# Patient Record
Sex: MALE | Race: WHITE | URBAN - NONMETROPOLITAN AREA
[De-identification: names, ages, dates, MRNs, and addresses within clinical notes are randomized per-mention and may not be internally consistent; named-entity substitution may affect disease eponyms.]

---

## 2017-07-13 ENCOUNTER — OFFICE VISIT (OUTPATIENT)
Dept: FAMILY MEDICINE | Facility: CLINIC | Age: 62
End: 2017-07-13
Payer: COMMERCIAL

## 2017-07-13 VITALS
BODY MASS INDEX: 23.11 KG/M2 | HEART RATE: 76 BPM | DIASTOLIC BLOOD PRESSURE: 70 MMHG | SYSTOLIC BLOOD PRESSURE: 126 MMHG | TEMPERATURE: 97.4 F | WEIGHT: 152 LBS | RESPIRATION RATE: 20 BRPM

## 2017-07-13 DIAGNOSIS — I10 BENIGN ESSENTIAL HYPERTENSION: Primary | ICD-10-CM

## 2017-07-13 DIAGNOSIS — R14.3 FLATULENCE/WIND: ICD-10-CM

## 2017-07-13 PROCEDURE — 36415 COLL VENOUS BLD VENIPUNCTURE: CPT | Performed by: NURSE PRACTITIONER

## 2017-07-13 PROCEDURE — 99213 OFFICE O/P EST LOW 20 MIN: CPT | Performed by: NURSE PRACTITIONER

## 2017-07-13 PROCEDURE — 80048 BASIC METABOLIC PNL TOTAL CA: CPT | Performed by: NURSE PRACTITIONER

## 2017-07-13 RX ORDER — LISINOPRIL 10 MG/1
10 TABLET ORAL DAILY
Qty: 90 TABLET | Refills: 3 | Status: SHIPPED | OUTPATIENT
Start: 2017-07-13 | End: 2018-07-05

## 2017-07-13 NOTE — PROGRESS NOTES
SUBJECTIVE:                                                    Mc Espana is a 61 year old male who presents to clinic today for the following health issues:  refuses FIT TEST or colonoscopy    Hypertension Follow-up      Outpatient blood pressures are being checked at dentist once .  Results are 130/70.    Low Salt Diet: not monitoring salt      Amount of exercise or physical activity: Phthisically active daily    Problems taking medications regularly: No    Medication side effects: none    Diet: regular (no restrictions)    BP Readings from Last 6 Encounters:   07/13/17 126/70   07/19/16 128/86   07/17/15 128/76   06/24/15 134/76   01/05/15 134/76   02/06/14 132/79       HPI:   PCP:  No primary care provider on file. None    Patient Active Problem List   Diagnosis     CARDIOVASCULAR SCREENING; LDL GOAL LESS THAN 160     Benign essential hypertension, BP <140/90     Current Outpatient Prescriptions   Medication     lisinopril (PRINIVIL/ZESTRIL) 10 MG tablet     latanoprost (XALATAN) 0.005 % ophthalmic solution     [DISCONTINUED] lisinopril (PRINIVIL,ZESTRIL) 10 MG tablet     No current facility-administered medications for this visit.        Reviewed and updated:  Tobacco  Allergies  Meds  Med Hx  Surg Hx  Fam Hx  Soc Hx     ROS:  Constitutional, HEENT, cardiovascular, pulmonary, gi and gu systems are negative, except as otherwise noted.  CV: NEGATIVE for chest pain, palpitations or peripheral edema and Hx HTN    PHYSICAL EXAM:   /70 (BP Location: Right arm, Patient Position: Sitting, Cuff Size: Adult Regular)  Pulse 76  Temp 97.4  F (36.3  C) (Tympanic)  Resp 20  Wt 152 lb (68.9 kg)  BMI 23.11 kg/m2  Body mass index is 23.11 kg/(m^2).  GENERAL APPEARANCE: healthy, alert and no distress  RESP: lungs clear to auscultation - no rales, rhonchi or wheezes  CV: regular rates and rhythm, normal S1 S2, no S3 or S4 and no murmur, click or rub  MS: extremities normal- no gross deformities noted  SKIN:  no suspicious lesions or rashes  PSYCH: mentation appears normal and affect normal/bright    ASSESSMENT & PLAN:   Mc was seen today for hypertension.    Diagnoses and all orders for this visit:    Benign essential hypertension, BP <140/90  -     lisinopril (PRINIVIL/ZESTRIL) 10 MG tablet; Take 1 tablet (10 mg) by mouth daily  -     Basic metabolic panel    Flatulence/wind          Patient Instructions     BP  Lisinopril refilled    Next year: due for fasting cholesterol    Prevention: Annual FIT TEST or colonoscopy      Colorectal Cancer Screening    Colorectal cancer (cancer in the colon or rectum) is a leading cause of cancer deaths in the [place] But it doesn t have to be. When this cancer is found and removed early, the chances of a full recovery are very good. Because colorectal cancer rarely causes symptoms in its early stages, screening for the disease is important. It s even more crucial if you have risk factors for the disease. Learn more about colorectal cancer and its risk factors. Then talk to your healthcare provider about being screened. You could be saving your own life.  Risk factors for colorectal cancer  Your risk of having colorectal cancer increases if you:    Are 50 years of age or older    Have a family history or personal history of colorectal cancer or polyps    Have a personal history of type 2 diabetes, Crohn s disease, or ulcerative colitis    Have an inherited genetic syndrome like Lieberman syndrome (also known as HNPCC) or familial adenomatous polyposis (FAP)    Are very overweight    Are not physically active    Smoke    Drink a lot of alcohol    Eat a lot of red or processed meat  The colon and rectum  Waste from food you eat enters the colon from the small intestine. As it travels through the colon, the waste (stool) loses water and becomes more solid. Intestinal muscles push it toward the sigmoid--the last section of the colon. Stool then moves into the rectum, where it s stored until  it s ready to leave the body during a bowel movement.  How cancer develops  Polyps are growths that form on the inner lining of the colon or rectum. Most are benign, which means they aren t cancerous. But over time, some polyps can become cancer (malignant). This happens when cells in these polyps begin growing abnormally. In time, malignant cells invade more and more of the colon and rectum. The cancer may also spread to nearby organs or lymph nodes or to other parts of the body. Finding and removing polyps can help prevent cancer from ever forming.  Your screening  Screening means looking for a health problem before you have symptoms. During screening for colorectal cancer, your healthcare provider will ask about your health history, examine you, and do one or more tests.  History and exam  The history and exam involve the following:    Health history. Your healthcare provider will ask about your health history. Mention if a family member has had colon cancer or polyps. Also mention any health problems you have had in the past.    Digital rectal exam (JUAN). During a JUAN, the healthcare provider inserts a lubricated gloved finger into the rectum. The test is painless and takes less than a minute. Healthcare providers agree that this test alone is not enough to screen for colorectal cancer.  Screening test choices  Fecal occult blood test (FOBT) or fecal immunochemical test (FIT)  These tests check for occult blood in stool (blood you can t see). Hidden blood may be a sign of colon polyps or cancer. A small sample of stool is tested for blood in a laboratory. Most often, you collect this sample at home using a kit your healthcare provider gives you. Follow the instructions carefully for using this kit. You might need to avoid certain foods and medicines before the test, as directed.    Scope exams  Here are two types of scope exams:    Colonoscopy. This test can be used to find and remove polyps anywhere in the  colon or rectum. The day before the test, you will do a bowel prep. This is a liquid diet plus a strong laxative solution or an enema. The bowel prep will cleanse your colon. You will be given instructions for this. Just before the test, you are given a medicine to make you sleepy. Then, a long, flexible, lighted tube called a colonoscope is gently inserted into the rectum and guided through the entire colon. Images of the colon are viewed on a video screen. Any polyps that are found are removed and sent to a lab for testing. If a polyp can t be removed, a sample of tissue is taken and the polyp might be removed later during surgery. You will need to bring someone with you to drive you home after this test.     Sigmoidoscopy. This test is similar to colonoscopy, but focuses only on the sigmoid colon and rectum. As with colonoscopy, bowel prep must be done the day before this test. It might not need to be as complete as the bowel prep for a colonoscopy. You are awake during the procedure, but you may be given medicine to help you relax. During the test, the healthcare provider guides a thin, flexible, lighted tube called a sigmoidoscope through your rectum and lower colon. The images are displayed on a video screen. Polyps are removed, if possible, and sent to a lab for testing.  Colonoscopy is the only screening test that lets your healthcare provider see the entire colon and rectum. This test also lets your healthcare provider remove any pieces of tissue that need to be looked at by a lab. If something suspicious is found using any other tests, you will likely need a colonoscopy.     When to call your healthcare provider after a test  Call your healthcare provider if you have any of the following after any screening test:    Bleeding    Fever of 100.4 F (38 C) or higher, or as directed by your healthcare provider    Abdominal pain    Vomiting   Date Last Reviewed: 11/4/2015 2000-2017 [employer]  [address] [place] [address]. All rights reserved. This information is not intended as a substitute for professional medical care. Always follow your healthcare professional's instructions.        Excess Gas  Certain foods produce gas when digested. In some people, these foods make an excessive amount of gas. This may cause bloating, burping, or increased gas passing through the rectum (flatulence).    Foods that cause gas  The following foods are more likely to cause this problem. Limit them, or remove them from your diet:    Broccoli    Cauliflower    [place] sprouts    Cabbage    Cooked dried beans    Fizzy (carbonated) drinks, such as sparkling water, soda, beer, and champagne  Other causes  Other causes of excess gas include:    Eating too fast or talking while you chew. This may cause you to swallow air. This increases the amount of gas in your stomach. And it may make your symptoms worse. Chew each mouthful completely before you swallow. Take your time.    Chewing on gum or sucking on hard candy. These cause you to swallow more often. And some of what you are swallowing is air. This leads to more gas in your stomach. Avoid chewing gum and hard candy.    Overeating. This may increase the feeling of being bloated and cause more gas. When you are full, stop eating.     Being constipated. This can increase the amount of normal intestinal gas. Avoid constipation by getting more fiber in your diet. Good sources of fiber include whole-grain cereal, fresh vegetables (except those in the above list), and fresh fruits. High-fiber foods absorb water and carry it out of the body. When adding more fiber to your diet, you also need to drink more water. You should drink at least 8, 8-ounce glasses of water (2 quarts) per day.  Date Last Reviewed: 8/1/2016 2000-2017 [employer]. [address] [place] [address]. All rights reserved. This information is not intended as a substitute for  professional medical care. Always follow your healthcare professional's instructions.          Risks, benefits, side effects and rationale for treatment plan fully discussed with the patient and understanding expressed.    VIN [facility]

## 2017-07-13 NOTE — PATIENT INSTRUCTIONS
BP  Lisinopril refilled    Next year: due for fasting cholesterol    Prevention: Annual FIT TEST or colonoscopy      Colorectal Cancer Screening    Colorectal cancer (cancer in the colon or rectum) is a leading cause of cancer deaths in the [place] But it doesn t have to be. When this cancer is found and removed early, the chances of a full recovery are very good. Because colorectal cancer rarely causes symptoms in its early stages, screening for the disease is important. It s even more crucial if you have risk factors for the disease. Learn more about colorectal cancer and its risk factors. Then talk to your healthcare provider about being screened. You could be saving your own life.  Risk factors for colorectal cancer  Your risk of having colorectal cancer increases if you:    Are 50 years of age or older    Have a family history or personal history of colorectal cancer or polyps    Have a personal history of type 2 diabetes, Crohn s disease, or ulcerative colitis    Have an inherited genetic syndrome like Lieberman syndrome (also known as HNPCC) or familial adenomatous polyposis (FAP)    Are very overweight    Are not physically active    Smoke    Drink a lot of alcohol    Eat a lot of red or processed meat  The colon and rectum  Waste from food you eat enters the colon from the small intestine. As it travels through the colon, the waste (stool) loses water and becomes more solid. Intestinal muscles push it toward the sigmoid--the last section of the colon. Stool then moves into the rectum, where it s stored until it s ready to leave the body during a bowel movement.  How cancer develops  Polyps are growths that form on the inner lining of the colon or rectum. Most are benign, which means they aren t cancerous. But over time, some polyps can become cancer (malignant). This happens when cells in these polyps begin growing abnormally. In time, malignant cells invade more and more of the colon and rectum. The cancer may  also spread to nearby organs or lymph nodes or to other parts of the body. Finding and removing polyps can help prevent cancer from ever forming.  Your screening  Screening means looking for a health problem before you have symptoms. During screening for colorectal cancer, your healthcare provider will ask about your health history, examine you, and do one or more tests.  History and exam  The history and exam involve the following:    Health history. Your healthcare provider will ask about your health history. Mention if a family member has had colon cancer or polyps. Also mention any health problems you have had in the past.    Digital rectal exam (JUAN). During a JUAN, the healthcare provider inserts a lubricated gloved finger into the rectum. The test is painless and takes less than a minute. Healthcare providers agree that this test alone is not enough to screen for colorectal cancer.  Screening test choices  Fecal occult blood test (FOBT) or fecal immunochemical test (FIT)  These tests check for occult blood in stool (blood you can t see). Hidden blood may be a sign of colon polyps or cancer. A small sample of stool is tested for blood in a laboratory. Most often, you collect this sample at home using a kit your healthcare provider gives you. Follow the instructions carefully for using this kit. You might need to avoid certain foods and medicines before the test, as directed.    Scope exams  Here are two types of scope exams:    Colonoscopy. This test can be used to find and remove polyps anywhere in the colon or rectum. The day before the test, you will do a bowel prep. This is a liquid diet plus a strong laxative solution or an enema. The bowel prep will cleanse your colon. You will be given instructions for this. Just before the test, you are given a medicine to make you sleepy. Then, a long, flexible, lighted tube called a colonoscope is gently inserted into the rectum and guided through the entire colon.  Images of the colon are viewed on a video screen. Any polyps that are found are removed and sent to a lab for testing. If a polyp can t be removed, a sample of tissue is taken and the polyp might be removed later during surgery. You will need to bring someone with you to drive you home after this test.     Sigmoidoscopy. This test is similar to colonoscopy, but focuses only on the sigmoid colon and rectum. As with colonoscopy, bowel prep must be done the day before this test. It might not need to be as complete as the bowel prep for a colonoscopy. You are awake during the procedure, but you may be given medicine to help you relax. During the test, the healthcare provider guides a thin, flexible, lighted tube called a sigmoidoscope through your rectum and lower colon. The images are displayed on a video screen. Polyps are removed, if possible, and sent to a lab for testing.  Colonoscopy is the only screening test that lets your healthcare provider see the entire colon and rectum. This test also lets your healthcare provider remove any pieces of tissue that need to be looked at by a lab. If something suspicious is found using any other tests, you will likely need a colonoscopy.     When to call your healthcare provider after a test  Call your healthcare provider if you have any of the following after any screening test:    Bleeding    Fever of 100.4 F (38 C) or higher, or as directed by your healthcare provider    Abdominal pain    Vomiting   Date Last Reviewed: 11/4/2015 2000-2017 [employer]. [address] [place] [id number]. All rights reserved. This information is not intended as a substitute for professional medical care. Always follow your healthcare professional's instructions.        Excess Gas  Certain foods produce gas when digested. In some people, these foods make an excessive amount of gas. This may cause bloating, burping, or increased gas passing through the rectum  (flatulence).    Foods that cause gas  The following foods are more likely to cause this problem. Limit them, or remove them from your diet:    Broccoli    Cauliflower    [place] sprouts    Cabbage    Cooked dried beans    Fizzy (carbonated) drinks, such as sparkling water, soda, beer, and champagne  Other causes  Other causes of excess gas include:    Eating too fast or talking while you chew. This may cause you to swallow air. This increases the amount of gas in your stomach. And it may make your symptoms worse. Chew each mouthful completely before you swallow. Take your time.    Chewing on gum or sucking on hard candy. These cause you to swallow more often. And some of what you are swallowing is air. This leads to more gas in your stomach. Avoid chewing gum and hard candy.    Overeating. This may increase the feeling of being bloated and cause more gas. When you are full, stop eating.     Being constipated. This can increase the amount of normal intestinal gas. Avoid constipation by getting more fiber in your diet. Good sources of fiber include whole-grain cereal, fresh vegetables (except those in the above list), and fresh fruits. High-fiber foods absorb water and carry it out of the body. When adding more fiber to your diet, you also need to drink more water. You should drink at least 8, 8-ounce glasses of water (2 quarts) per day.  Date Last Reviewed: 8/1/2016 2000-2017 [employer]. [address] [place] [address], [place] [address]. All rights reserved. This information is not intended as a substitute for professional medical care. Always follow your healthcare professional's instructions.

## 2017-07-13 NOTE — NURSING NOTE
"Chief Complaint   Patient presents with     Hypertension       Initial /70 (BP Location: Right arm, Patient Position: Sitting, Cuff Size: Adult Regular)  Pulse 76  Temp 97.4  F (36.3  C) (Tympanic)  Resp 20  Wt 152 lb (68.9 kg)  BMI 23.11 kg/m2 Estimated body mass index is 23.11 kg/(m^2) as calculated from the following:    Height as of 7/19/16: 5' 8\" (1.727 m).    Weight as of this encounter: 152 lb (68.9 kg).  Medication Reconciliation: complete    Health Maintenance that is potentially due pending provider review:  Colonoscopy/FIT    Pt declines to have again this year.    "

## 2017-07-13 NOTE — LETTER
[place] [address] [facility] [address]  Phone: [phone]  Fax: [phone]    July 14, 2017    Mc Espana  [address] [facility] [address]          Dear Mr. Espana,    The results of your recent lab tests were within normal limits. Enclosed is a copy of these results.    If you have any further questions or problems, please contact our office.    Sincerely,      Madalyn Burgos CNP/ starla

## 2017-07-14 LAB
ANION GAP SERPL CALCULATED.3IONS-SCNC: 6 MMOL/L (ref 3–14)
BUN SERPL-MCNC: 22 MG/DL (ref 7–30)
CALCIUM SERPL-MCNC: 8.8 MG/DL (ref 8.5–10.1)
CHLORIDE SERPL-SCNC: 105 MMOL/L (ref 94–109)
CO2 SERPL-SCNC: 27 MMOL/L (ref 20–32)
CREAT SERPL-MCNC: 0.88 MG/DL (ref 0.66–1.25)
GFR SERPL CREATININE-BSD FRML MDRD: 88 ML/MIN/1.7M2
GLUCOSE SERPL-MCNC: 96 MG/DL (ref 70–99)
POTASSIUM SERPL-SCNC: 4.2 MMOL/L (ref 3.4–5.3)
SODIUM SERPL-SCNC: 138 MMOL/L (ref 133–144)

## 2017-07-14 NOTE — PROGRESS NOTES
Normal BMP  Please notify him of these results and send a hard copy if not on myChart.   Thanks. VIN Ji

## 2018-07-05 ENCOUNTER — OFFICE VISIT (OUTPATIENT)
Dept: FAMILY MEDICINE | Facility: CLINIC | Age: 63
End: 2018-07-05
Payer: COMMERCIAL

## 2018-07-05 VITALS
TEMPERATURE: 97.8 F | RESPIRATION RATE: 20 BRPM | DIASTOLIC BLOOD PRESSURE: 74 MMHG | SYSTOLIC BLOOD PRESSURE: 128 MMHG | BODY MASS INDEX: 23.34 KG/M2 | HEART RATE: 76 BPM | WEIGHT: 154 LBS | HEIGHT: 68 IN

## 2018-07-05 DIAGNOSIS — I10 BENIGN ESSENTIAL HYPERTENSION: Primary | ICD-10-CM

## 2018-07-05 DIAGNOSIS — Z12.11 COLON CANCER SCREENING: ICD-10-CM

## 2018-07-05 DIAGNOSIS — Z13.6 CARDIOVASCULAR SCREENING; LDL GOAL LESS THAN 160: ICD-10-CM

## 2018-07-05 DIAGNOSIS — L20.89 OTHER ATOPIC DERMATITIS: ICD-10-CM

## 2018-07-05 DIAGNOSIS — Z23 NEED FOR TDAP VACCINATION: ICD-10-CM

## 2018-07-05 LAB
ANION GAP SERPL CALCULATED.3IONS-SCNC: 7 MMOL/L (ref 3–14)
BUN SERPL-MCNC: 22 MG/DL (ref 7–30)
CALCIUM SERPL-MCNC: 8.7 MG/DL (ref 8.5–10.1)
CHLORIDE SERPL-SCNC: 106 MMOL/L (ref 94–109)
CHOLEST SERPL-MCNC: 199 MG/DL
CO2 SERPL-SCNC: 24 MMOL/L (ref 20–32)
CREAT SERPL-MCNC: 0.84 MG/DL (ref 0.66–1.25)
GFR SERPL CREATININE-BSD FRML MDRD: >90 ML/MIN/1.7M2
GLUCOSE SERPL-MCNC: 94 MG/DL (ref 70–99)
HDLC SERPL-MCNC: 42 MG/DL
LDLC SERPL CALC-MCNC: 139 MG/DL
NONHDLC SERPL-MCNC: 157 MG/DL
POTASSIUM SERPL-SCNC: 4.2 MMOL/L (ref 3.4–5.3)
SODIUM SERPL-SCNC: 137 MMOL/L (ref 133–144)
TRIGL SERPL-MCNC: 89 MG/DL

## 2018-07-05 PROCEDURE — 90715 TDAP VACCINE 7 YRS/> IM: CPT | Performed by: NURSE PRACTITIONER

## 2018-07-05 PROCEDURE — 82274 ASSAY TEST FOR BLOOD FECAL: CPT | Performed by: NURSE PRACTITIONER

## 2018-07-05 PROCEDURE — 36415 COLL VENOUS BLD VENIPUNCTURE: CPT | Performed by: NURSE PRACTITIONER

## 2018-07-05 PROCEDURE — 80061 LIPID PANEL: CPT | Performed by: NURSE PRACTITIONER

## 2018-07-05 PROCEDURE — 99213 OFFICE O/P EST LOW 20 MIN: CPT | Mod: 25 | Performed by: NURSE PRACTITIONER

## 2018-07-05 PROCEDURE — 80048 BASIC METABOLIC PNL TOTAL CA: CPT | Performed by: NURSE PRACTITIONER

## 2018-07-05 PROCEDURE — 90471 IMMUNIZATION ADMIN: CPT | Performed by: NURSE PRACTITIONER

## 2018-07-05 RX ORDER — LISINOPRIL 10 MG/1
10 TABLET ORAL DAILY
Qty: 90 TABLET | Refills: 3 | Status: SHIPPED | OUTPATIENT
Start: 2018-07-05 | End: 2019-07-01

## 2018-07-05 NOTE — PATIENT INSTRUCTIONS
1. Benign essential hypertension, BP <140/90  Chronic, stable  - lisinopril (PRINIVIL/ZESTRIL) 10 MG tablet; Take 1 tablet (10 mg) by mouth daily  Dispense: 90 tablet; Refill: 3  - Basic metabolic panel  - TDAP vaccination today  2. Colon cancer screening  Screening  - Fecal colorectal cancer screen (FIT); Future: complete and mail in    3. CARDIOVASCULAR SCREENING; LDL GOAL LESS THAN 160  Screening  - Lipid panel reflex to direct LDL Fasting    4. Other atopic dermatitis  Chronic, stable  Use Hydrocortisone ointment to area, and cover with Vaseline or Aquaphor  You may also add Beatris wrap on top to help seal in those items  Keeping a glove on while milking can also keep that area free from irritants.   Skin care regimen:   A) Eliminate harsh soaps, i.e. Dial, zest, irsih spring;   B) Use mild soaps such as Cetaphil or Dove sensitive skin  C) Avoid hot or cold showers  D) Aaggressive use of emollients(creams) including: Vanicream, Cetaphil or Cerave, Aquaphor    TO DO:  Complete [employer] paperwork and drop off at    Complete FIT TEST and mail in

## 2018-07-05 NOTE — NURSING NOTE
"Chief Complaint   Patient presents with     Hypertension       Initial /74 (BP Location: Right arm, Patient Position: Sitting, Cuff Size: Adult Regular)  Pulse 76  Temp 97.8  F (36.6  C) (Tympanic)  Resp 20  Ht 5' 8\" (1.727 m)  Wt 154 lb (69.9 kg)  BMI 23.42 kg/m2 Estimated body mass index is 23.42 kg/(m^2) as calculated from the following:    Height as of this encounter: 5' 8\" (1.727 m).    Weight as of this encounter: 154 lb (69.9 kg).      Health Maintenance that is potentially due pending provider review:  Colonoscopy/FIT    FIT sent home today .    Is there anyone who you would like to be able to receive your results? No  If yes have patient fill out BETZY    "

## 2018-07-05 NOTE — LETTER
July 10, 2018      Mc Espana  [address] [facility] [address]      Dear ,    We are writing to inform you of your test results.    BMP- normal   Lipids- normal   Recommend you start taking one baby Aspirin 81 mg daily to reduce heart attack and stroke risk.    Resulted Orders   Basic metabolic panel   Result Value Ref Range    Sodium 137 133 - 144 mmol/L    Potassium 4.2 3.4 - 5.3 mmol/L    Chloride 106 94 - 109 mmol/L    Carbon Dioxide 24 20 - 32 mmol/L    Anion Gap 7 3 - 14 mmol/L    Glucose 94 70 - 99 mg/dL    Urea Nitrogen 22 7 - 30 mg/dL    Creatinine 0.84 0.66 - 1.25 mg/dL    GFR Estimate >90 >60 mL/min/1.7m2      Comment:      Non  GFR Calc    GFR Estimate If Black >90 >60 mL/min/1.7m2      Comment:       GFR Calc    Calcium 8.7 8.5 - 10.1 mg/dL   Lipid panel reflex to direct LDL Fasting   Result Value Ref Range    Cholesterol 199 <200 mg/dL    Triglycerides 89 <150 mg/dL    HDL Cholesterol 42 >39 mg/dL    LDL Cholesterol Calculated 139 (H) <100 mg/dL      Comment:      Above desirable:  100-129 mg/dl  Borderline High:  130-159 mg/dL  High:             160-189 mg/dL  Very high:       >189 mg/dl      Non HDL Cholesterol 157 (H) <130 mg/dL      Comment:      Above Desirable:  130-159 mg/dl  Borderline high:  160-189 mg/dl  High:             190-219 mg/dl  Very high:       >219 mg/dl         If you have any questions or concerns, please call the clinic at the number listed above.       Sincerely,        Madalyn Burgos, CNP

## 2018-07-05 NOTE — MR AVS SNAPSHOT
After Visit Summary   7/5/2018    Mc Espana    MRN: [id number]           Patient Information     Date Of Birth          1955        Visit Information        Provider Department      7/5/2018 11:00 AM Madalyn Burgos, CNP [facility]        Today's Diagnoses     Benign essential hypertension, BP <140/90    -  1    Other atopic dermatitis        Colon cancer screening        CARDIOVASCULAR SCREENING; LDL GOAL LESS THAN 160          Care Instructions    1. Benign essential hypertension, BP <140/90  Chronic, stable  - lisinopril (PRINIVIL/ZESTRIL) 10 MG tablet; Take 1 tablet (10 mg) by mouth daily  Dispense: 90 tablet; Refill: 3  - Basic metabolic panel  - TDAP vaccination today  2. Colon cancer screening  Screening  - Fecal colorectal cancer screen (FIT); Future: complete and mail in    3. CARDIOVASCULAR SCREENING; LDL GOAL LESS THAN 160  Screening  - Lipid panel reflex to direct LDL Fasting    4. Other atopic dermatitis  Chronic, stable  Use Hydrocortisone ointment to area, and cover with Vaseline or Aquaphor  You may also add Beatris wrap on top to help seal in those items  Keeping a glove on while milking can also keep that area free from irritants.   Skin care regimen:   A) Eliminate harsh soaps, i.e. Dial, zest, irsih spring;   B) Use mild soaps such as Cetaphil or Dove sensitive skin  C) Avoid hot or cold showers  D) Aaggressive use of emollients(creams) including: Vanicream, Cetaphil or Cerave, Aquaphor    TO DO:  Complete [employer] paperwork and drop off at    Complete FIT TEST and mail in            Follow-ups after your visit        Future tests that were ordered for you today     Open Future Orders        Priority Expected Expires Ordered    Fecal colorectal cancer screen (FIT) Routine 7/26/2018 9/27/2018 7/5/2018            Who to contact     If you have questions or need follow up information about today's clinic visit or your schedule please  "contact [facility] directly at [phone].  Normal or non-critical lab and imaging results will be communicated to you by MyChart, letter or phone within 4 business days after the clinic has received the results. If you do not hear from us within 7 days, please contact the clinic through MyChart or phone. If you have a critical or abnormal lab result, we will notify you by phone as soon as possible.  Submit refill requests through [employer] or call your pharmacy and they will forward the refill request to us. Please allow 3 business days for your refill to be completed.          Additional Information About Your Visit        Care EveryWhere ID     This is your Care EveryWhere ID. This could be used by other organizations to access your [place] medical records  [id number]        Your Vitals Were     Pulse Temperature Respirations Height BMI (Body Mass Index)       76 97.8  F (36.6  C) (Tympanic) 20 5' 8\" (1.727 m) 23.42 kg/m2        Blood Pressure from Last 3 Encounters:   07/05/18 128/74   07/13/17 126/70   07/19/16 128/86    Weight from Last 3 Encounters:   07/05/18 154 lb (69.9 kg)   07/13/17 152 lb (68.9 kg)   07/19/16 153 lb (69.4 kg)              We Performed the Following     Basic metabolic panel     Lipid panel reflex to direct LDL Fasting          Where to get your medicines      These medications were sent to [facility] [place] [address] [facility]  [address] [place] [address]     Phone:  [phone]     lisinopril 10 MG tablet          Primary Care Provider Office Phone # Fax #    Madalyn Jessicamainor Burgos [phone]       [address] [id number]        Equal Access to Services     TIFF QUILES AH: Lukasz Bah, wameghanda luqadaha, qaybta kaalmada shraddhayada, diana alvarez. So [facility] [phone].    ATENCIÓN: Si habla español, tiene a dodson disposición servicios gratuitos de asistencia " lingüística. Lizzette al [phone].    We comply with applicable federal civil rights laws and [place] laws. We do not discriminate on the basis of race, color, national origin, age, disability, sex, sexual orientation, or gender identity.            Thank you!     Thank you for choosing [facility]  for your care. Our goal is always to provide you with excellent care. Hearing back from our patients is one way we can continue to improve our services. Please take a few minutes to complete the written survey that you may receive in the mail after your visit with us. Thank you!             Your Updated Medication List - Protect others around you: Learn how to safely use, store and throw away your medicines at www.disposemymeds.org.          This list is accurate as of 7/5/18 11:49 AM.  Always use your most recent med list.                   Brand Name Dispense Instructions for use Diagnosis    latanoprost 0.005 % ophthalmic solution    XALATAN     1 drop At Bedtime Both eyes    Tachycardia       lisinopril 10 MG tablet    PRINIVIL/ZESTRIL    90 tablet    Take 1 tablet (10 mg) by mouth daily    Benign essential hypertension

## 2018-07-05 NOTE — PROGRESS NOTES
"  SUBJECTIVE:   Mc Espana is a 62 year old male who presents to clinic today for the following health issues:      Hypertension Follow-up      Outpatient blood pressures are not being checked.    Low Salt Diet: not monitoring salt      Amount of exercise or physical activity: Hurtado     Problems taking medication regularly: No    Medication side effects: none    Diet: regular (no restrictions)    BP Readings from Last 6 Encounters:   07/05/18 128/74   07/13/17 126/70   07/19/16 128/86   07/17/15 128/76   06/24/15 134/76   01/05/15 134/76       HPI:   PCP:  Madalyn Burgos, [phone]    Patient Active Problem List   Diagnosis     CARDIOVASCULAR SCREENING; LDL GOAL LESS THAN 160     Benign essential hypertension, BP <140/90     Current Outpatient Prescriptions   Medication     latanoprost (XALATAN) 0.005 % ophthalmic solution     lisinopril (PRINIVIL/ZESTRIL) 10 MG tablet     [DISCONTINUED] lisinopril (PRINIVIL/ZESTRIL) 10 MG tablet     No current facility-administered medications for this visit.        Health Maintenance Due   Topic Date Due     TETANUS IMMUNIZATION (SYSTEM ASSIGNED)  12/30/1972     HIV SCREEN (SYSTEM ASSIGNED)  12/30/1972     HEPATITIS C SCREENING  12/30/1972     COLON CANCER SCREEN (SYSTEM ASSIGNED)  12/30/2004     ADVANCE DIRECTIVE PLANNING Q5 YRS  12/30/2009     PHQ-2 Q1 YR  07/19/2017       Reviewed and updated:  Tobacco  Allergies  Meds  Med Hx  Surg Hx  Fam Hx  Soc Hx     ROS:  Constitutional, HEENT, cardiovascular, pulmonary, gi and gu systems are negative, except as otherwise noted.    PHYSICAL EXAM:   /74 (BP Location: Right arm, Patient Position: Sitting, Cuff Size: Adult Regular)  Pulse 76  Temp 97.8  F (36.6  C) (Tympanic)  Resp 20  Ht 5' 8\" (1.727 m)  Wt 154 lb (69.9 kg)  BMI 23.42 kg/m2  Body mass index is 23.42 kg/(m^2).  GENERAL APPEARANCE: healthy, alert and no distress  NECK: no adenopathy, no asymmetry, masses, or scars and thyroid normal to " palpation  RESP: lungs clear to auscultation - no rales, rhonchi or wheezes  CV: regular rates and rhythm, normal S1 S2, no S3 or S4 and no murmur, click or rub  MS: extremities normal- no gross deformities noted  SKIN: small patch of dry skin on right thumb  PSYCH: mentation appears normal and affect normal/bright    ASSESSMENT & PLAN:     1. Benign essential hypertension, BP <140/90  Chronic, stable  - lisinopril (PRINIVIL/ZESTRIL) 10 MG tablet; Take 1 tablet (10 mg) by mouth daily  Dispense: 90 tablet; Refill: 3  - Basic metabolic panel  - TDAP vaccination today  2. Colon cancer screening  Screening  - Fecal colorectal cancer screen (FIT); Future: complete and mail in    3. CARDIOVASCULAR SCREENING; LDL GOAL LESS THAN 160  Screening  - Lipid panel reflex to direct LDL Fasting    4. Other atopic dermatitis  Chronic, stable  Use Hydrocortisone ointment to area, and cover with Vaseline or Aquaphor  You may also add Beatris wrap on top to help seal in those items  Keeping a glove on while milking can also keep that area free from irritants.   Skin care regimen:   A) Eliminate harsh soaps, i.e. Dial, zest, irsih spring;   B) Use mild soaps such as Cetaphil or Dove sensitive skin  C) Avoid hot or cold showers  D) Aaggressive use of emollients(creams) including: Vanicream, Cetaphil or Cerave, Aquaphor    TO DO:  Complete [employer] paperwork and drop off at    Complete FIT TEST and mail in      Risks, benefits, side effects and rationale for treatment plan fully discussed with the patient and understanding expressed.    Madalyn Burgos, [facility]

## 2018-07-06 NOTE — PROGRESS NOTES
BMP- normal  Lipids- normal  Recommend you start taking one baby Aspirin 81 mg  daily to reduce heart attack and stroke risk  The 10-year ASCVD risk score (L[place] DC Jr, et al., 2013) is: 14.1%    Values used to calculate the score:      Age: 62 years      Sex: Male      Is Non- : No      Diabetic: No      Tobacco smoker: No      Systolic Blood Pressure: 128 mmHg      Is BP treated: Yes      HDL Cholesterol: 42 mg/dL      Total Cholesterol: 199 mg/dL    Please notify him of these results and send a hard copy if not on [employer].   Thanks. VIN Ji

## 2018-07-07 LAB — HEMOCCULT STL QL IA: NEGATIVE

## 2018-07-09 DIAGNOSIS — Z12.11 COLON CANCER SCREENING: ICD-10-CM

## 2018-07-09 NOTE — LETTER
July 13, 2018      Mc Espana  [address] [facility] [address]        Dear ,    The results of you recent FIT test (screening for colon cancer) were negative. If you have any further questions please call our clinic at [phone].    Thank you for letting [facility] assist you in your health care.      Resulted Orders   Fecal colorectal cancer screen (FIT)   Result Value Ref Range    Occult Blood Scn FIT Negative NEG^Negative     Sincerely,    Madalyn Burgos, SOY/ec

## 2018-07-11 NOTE — PROGRESS NOTES
FIT TEST negative. Repeat yearly in lieu of colonoscopy.   Please notify him of these results and send a hard copy if not on [employer].   Thanks. VIN Ji

## 2018-07-30 ENCOUNTER — DOCUMENTATION ONLY (OUTPATIENT)
Dept: OTHER | Facility: CLINIC | Age: 63
End: 2018-07-30

## 2018-08-06 ENCOUNTER — TELEPHONE (OUTPATIENT)
Dept: FAMILY MEDICINE | Facility: CLINIC | Age: 63
End: 2018-08-06

## 2018-08-06 NOTE — TELEPHONE ENCOUNTER
Patient's sister-in-law Joy is calling stating she has some questions on the health care directive that Mc sent in. She received a letter about it and she has some questions about the letter. Told her Reema the nurse usually knows the most about these and she would be in tomorrow. Joy would like to talk to her about this.    Mitzy [place]

## 2018-08-06 NOTE — TELEPHONE ENCOUNTER
In reviewing chart, it appears that document did not name the principal or listed themselves as the principal.  New document needs to be prepared and notarized.  Left message for Joy to call us back.    Naz Shaw RN

## 2018-08-17 NOTE — TELEPHONE ENCOUNTER
Spoke with Joy who states new/ corrected HCD has been done and will drop off at clinic.  AN Dodson RN

## 2018-10-29 ENCOUNTER — DOCUMENTATION ONLY (OUTPATIENT)
Dept: OTHER | Facility: CLINIC | Age: 63
End: 2018-10-29

## 2019-07-01 ENCOUNTER — OFFICE VISIT (OUTPATIENT)
Dept: FAMILY MEDICINE | Facility: CLINIC | Age: 64
End: 2019-07-01
Payer: COMMERCIAL

## 2019-07-01 VITALS
SYSTOLIC BLOOD PRESSURE: 126 MMHG | HEIGHT: 66 IN | RESPIRATION RATE: 18 BRPM | DIASTOLIC BLOOD PRESSURE: 68 MMHG | WEIGHT: 144.4 LBS | HEART RATE: 66 BPM | TEMPERATURE: 97.4 F | BODY MASS INDEX: 23.21 KG/M2

## 2019-07-01 DIAGNOSIS — Z12.11 SPECIAL SCREENING FOR MALIGNANT NEOPLASMS, COLON: ICD-10-CM

## 2019-07-01 DIAGNOSIS — I10 BENIGN ESSENTIAL HYPERTENSION: Primary | ICD-10-CM

## 2019-07-01 DIAGNOSIS — Z13.6 CARDIOVASCULAR SCREENING; LDL GOAL LESS THAN 160: ICD-10-CM

## 2019-07-01 LAB
ANION GAP SERPL CALCULATED.3IONS-SCNC: 7 MMOL/L (ref 3–14)
BUN SERPL-MCNC: 22 MG/DL (ref 7–30)
CALCIUM SERPL-MCNC: 9.1 MG/DL (ref 8.5–10.1)
CHLORIDE SERPL-SCNC: 105 MMOL/L (ref 94–109)
CHOLEST SERPL-MCNC: 200 MG/DL
CO2 SERPL-SCNC: 25 MMOL/L (ref 20–32)
CREAT SERPL-MCNC: 0.83 MG/DL (ref 0.66–1.25)
GFR SERPL CREATININE-BSD FRML MDRD: >90 ML/MIN/{1.73_M2}
GLUCOSE SERPL-MCNC: 82 MG/DL (ref 70–99)
HDLC SERPL-MCNC: 49 MG/DL
LDLC SERPL CALC-MCNC: 127 MG/DL
NONHDLC SERPL-MCNC: 151 MG/DL
POTASSIUM SERPL-SCNC: 3.8 MMOL/L (ref 3.4–5.3)
SODIUM SERPL-SCNC: 137 MMOL/L (ref 133–144)
TRIGL SERPL-MCNC: 120 MG/DL

## 2019-07-01 PROCEDURE — 80061 LIPID PANEL: CPT | Performed by: NURSE PRACTITIONER

## 2019-07-01 PROCEDURE — 80048 BASIC METABOLIC PNL TOTAL CA: CPT | Performed by: NURSE PRACTITIONER

## 2019-07-01 PROCEDURE — 36415 COLL VENOUS BLD VENIPUNCTURE: CPT | Performed by: NURSE PRACTITIONER

## 2019-07-01 PROCEDURE — 99213 OFFICE O/P EST LOW 20 MIN: CPT | Performed by: NURSE PRACTITIONER

## 2019-07-01 RX ORDER — LISINOPRIL 10 MG/1
10 TABLET ORAL DAILY
Qty: 90 TABLET | Refills: 3 | Status: SHIPPED | OUTPATIENT
Start: 2019-07-01 | End: 2020-05-29

## 2019-07-01 ASSESSMENT — MIFFLIN-ST. JEOR: SCORE: 1391.71

## 2019-07-01 ASSESSMENT — PAIN SCALES - GENERAL: PAINLEVEL: NO PAIN (0)

## 2019-07-01 NOTE — LETTER
July 2, 2019      Mc Espana  [address] [address] [address]        Dear ,    We are writing to inform you of your test results.    The results of your recent lipid (cholesterol) profile were STABLE. Keep up the good work!. As you may know, an elevated cholesterol is one factor that increases your risk for heart disease and stroke. You can improve your cholesterol by controlling the amount and type of fat you eat and by increasing your daily activity level.     Here are some ways to improve your nutrition:   Eat less fat (especially butter, Crisco and other saturated fats)   Buy lean cuts of meat, reduce your portions of red meat or substitute poultry or fish   Use skim milk and low-fat dairy products   Eat no more than 4 egg yolks per week   Avoid fried or fast foods that are high in fat   Eat more fruits and vegetables .      Other lab results electrolytes, Glucose (blood sugar)  and Kidney test, metabolic profile  was / were normal. Please continue with current treatment plan.    Resulted Orders   Lipid Profile   Result Value Ref Range    Cholesterol 200 (H) <200 mg/dL      Comment:      Desirable:       <200 mg/dl    Triglycerides 120 <150 mg/dL      Comment:      Fasting specimen    HDL Cholesterol 49 >39 mg/dL    LDL Cholesterol Calculated 127 (H) <100 mg/dL      Comment:      Above desirable:  100-129 mg/dl  Borderline High:  130-159 mg/dL  High:             160-189 mg/dL  Very high:       >189 mg/dl      Non HDL Cholesterol 151 (H) <130 mg/dL      Comment:      Above Desirable:  130-159 mg/dl  Borderline high:  160-189 mg/dl  High:             190-219 mg/dl  Very high:       >219 mg/dl     Basic metabolic panel   Result Value Ref Range    Sodium 137 133 - 144 mmol/L    Potassium 3.8 3.4 - 5.3 mmol/L    Chloride 105 94 - 109 mmol/L    Carbon Dioxide 25 20 - 32 mmol/L    Anion Gap 7 3 - 14 mmol/L    Glucose 82 70 - 99 mg/dL      Comment:      Fasting specimen    Urea Nitrogen 22 7 - 30  mg/dL    Creatinine 0.83 0.66 - 1.25 mg/dL    GFR Estimate >90 >60 mL/min/[1.73_m2]      Comment:      Non  GFR Calc  Starting 12/18/2018, serum creatinine based estimated GFR (eGFR) will be   calculated using the Chronic Kidney Disease Epidemiology Collaboration   (CKD-EPI) equation.      GFR Estimate If Black >90 >60 mL/min/[1.73_m2]      Comment:       GFR Calc  Starting 12/18/2018, serum creatinine based estimated GFR (eGFR) will be   calculated using the Chronic Kidney Disease Epidemiology Collaboration   (CKD-EPI) equation.      Calcium 9.1 8.5 - 10.1 mg/dL       If you have any questions or concerns, please call the clinic at the number listed above.       Sincerely,        AJ Ross CNP

## 2019-07-01 NOTE — PATIENT INSTRUCTIONS
Refill on Lisinopril - no changes today     Lab work - will update you on results and recommendations    Okay to put low potency over the counter hydrocortisone cream to area on side of temple     Return to clinic in 1 year for recheck

## 2019-07-01 NOTE — PROGRESS NOTES
"    SUBJECTIVE   Mc Espana is a  male who presents to clinic today for the following health issue(s):       Hypertension Follow-up      Do you check your blood pressure regularly outside of the clinic? No     Are you following a low salt diet? No    Are your blood pressures ever more than 140 on the top number (systolic) OR more   than 90 on the bottom number (diastolic), for example 140/90? No    Amount of exercise or physical activity: diary gonzalez    Problems taking medications regularly: No    Medication side effects: none    Diet: regular (no restrictions)        PCP   Madalyn Burgos, [phone]    Health Maintenance        Health Maintenance Due   Topic Date Due     PREVENTIVE CARE VISIT  1955     HEPATITIS C SCREENING  1955     HIV SCREENING  12/30/1969     ZOSTER IMMUNIZATION (1 of 2) 12/30/2004     FIT  07/05/2019       HPI        Patient Active Problem List   Diagnosis     CARDIOVASCULAR SCREENING; LDL GOAL LESS THAN 160     Benign essential hypertension, BP <140/90     Current Outpatient Medications   Medication     latanoprost (XALATAN) 0.005 % ophthalmic solution     lisinopril (PRINIVIL/ZESTRIL) 10 MG tablet     No current facility-administered medications for this visit.        Reviewed and updated as needed this visit by Provider:  Tobacco  Allergies  Meds  Med Hx  Surg Hx  Fam Hx  Soc Hx     ROS:  Constitutional, HEENT, cardiovascular, pulmonary, gi and gu systems are negative, except as otherwise noted.    PHYSICAL EXAM   /68   Pulse 66   Temp 97.4  F (36.3  C)   Resp 18   Ht 1.683 m (5' 6.25\")   Wt 65.5 kg (144 lb 6.4 oz)   BMI 23.13 kg/m    Body mass index is 23.13 kg/m .  GENERAL APPEARANCE: healthy, alert and no distress  EYES: Eyes grossly normal to inspection, PERRL and conjunctivae and sclerae normal  HENT: ear canals and TM's normal and nose and mouth without ulcers or lesionsear canals and TM's normal and nose and mouth without ulcers or " lesions  NECK: no adenopathy, no asymmetry, masses, or scars and thyroid normal to palpation  RESP: lungs clear to auscultation - no rales, rhonchi or wheezes  CV: regular rates and rhythm, normal S1 S2, no S3 or S4, no murmur, click or rub and no bruits heard  ABDOMEN: soft, nontender, without hepatosplenomegaly or masses and bowel sounds normal  MS: extremities normal- no gross deformities noted and peripheral pulses normal  SKIN: no suspicious lesions or rashes  NEURO: Normal strength and tone, mentation intact and speech normal  PSYCH: mentation appears normal and affect normal/bright      Diagnostic Test Results:  Pending     ASSESSMENT & PLAN   Assessment & Plan     1. Benign essential hypertension, BP <140/90  Chronic, stable.  No changes.  Follow-up 1 year with PCP.  Lab recheck.  - lisinopril (PRINIVIL/ZESTRIL) 10 MG tablet; Take 1 tablet (10 mg) by mouth daily  Dispense: 90 tablet; Refill: 3  - Basic metabolic panel    2. CARDIOVASCULAR SCREENING; LDL GOAL LESS THAN 160  Screen  - Lipid Profile    3. Special screening for malignant neoplasms, colon    - Fecal colorectal cancer screen (FIT); Future       Patient Instructions   Refill on Lisinopril - no changes today     Lab work - will update you on results and recommendations    Okay to put low potency over the counter hydrocortisone cream to area on side of temple     Return to clinic in 1 year for recheck       Return in about 1 year (around 7/1/2020) for Routine Visit.    Risks, benefits, side effects and rationale for treatment plan fully discussed with the patient and understanding expressed.    AJ Stevens CNP   [facility]

## 2019-07-01 NOTE — NURSING NOTE
"Chief Complaint   Patient presents with     Hypertension       Initial /68   Pulse 66   Temp 97.4  F (36.3  C)   Resp 18   Ht 1.683 m (5' 6.25\")   Wt 65.5 kg (144 lb 6.4 oz)   BMI 23.13 kg/m   Estimated body mass index is 23.13 kg/m  as calculated from the following:    Height as of this encounter: 1.683 m (5' 6.25\").    Weight as of this encounter: 65.5 kg (144 lb 6.4 oz).    Patient presents to the clinic using No DME    Health Maintenance that is potentially due pending provider review:  Colonoscopy/FIT    Gave pt phone number/pended order to schedule mammo and/or colonoscopy(or FIT)    Is there anyone who you would like to be able to receive your results? not asked  If yes have patient fill out BETZY    "

## 2019-07-07 PROCEDURE — 82274 ASSAY TEST FOR BLOOD FECAL: CPT | Performed by: NURSE PRACTITIONER

## 2019-07-11 DIAGNOSIS — Z12.11 SPECIAL SCREENING FOR MALIGNANT NEOPLASMS, COLON: ICD-10-CM

## 2019-07-11 LAB — HEMOCCULT STL QL IA: NEGATIVE

## 2020-05-29 DIAGNOSIS — I10 BENIGN ESSENTIAL HYPERTENSION: ICD-10-CM

## 2020-05-29 RX ORDER — LISINOPRIL 10 MG/1
10 TABLET ORAL DAILY
Qty: 90 TABLET | Refills: 1 | Status: SHIPPED | OUTPATIENT
Start: 2020-05-29 | End: 2020-12-23

## 2020-12-22 DIAGNOSIS — I10 BENIGN ESSENTIAL HYPERTENSION: ICD-10-CM

## 2020-12-23 RX ORDER — LISINOPRIL 10 MG/1
10 TABLET ORAL DAILY
Qty: 90 TABLET | Refills: 1 | Status: SHIPPED | OUTPATIENT
Start: 2020-12-23

## 2020-12-23 NOTE — TELEPHONE ENCOUNTER
"Requested Prescriptions   Pending Prescriptions Disp Refills     lisinopril (ZESTRIL) 10 MG tablet 90 tablet 1     Sig: Take 1 tablet (10 mg) by mouth daily       ACE Inhibitors (Including Combos) Protocol Failed - 12/22/2020 12:16 PM        Failed - Blood pressure under 140/90 in past 12 months     BP Readings from Last 3 Encounters:   07/01/19 126/68   07/05/18 128/74   07/13/17 126/70                 Failed - Recent (12 mo) or future (30 days) visit within the authorizing provider's specialty     Patient has had an office visit with the authorizing provider or a provider within the authorizing providers department within the previous 12 mos or has a future within next 30 days. See \"Patient Info\" tab in inbasket, or \"Choose Columns\" in Meds & Orders section of the refill encounter.              Failed - Normal serum creatinine on file in past 12 months     Recent Labs   Lab Test 07/01/19  1620   CR 0.83       Ok to refill medication if creatinine is low          Failed - Normal serum potassium on file in past 12 months     Recent Labs   Lab Test 07/01/19  1620   POTASSIUM 3.8             Passed - Medication is active on med list        Passed - Patient is age 18 or older             "
